# Patient Record
Sex: FEMALE | Race: WHITE | Employment: STUDENT | URBAN - METROPOLITAN AREA
[De-identification: names, ages, dates, MRNs, and addresses within clinical notes are randomized per-mention and may not be internally consistent; named-entity substitution may affect disease eponyms.]

---

## 2018-08-21 RX ORDER — ASCORBIC ACID 500 MG
500 TABLET ORAL DAILY PRN
COMMUNITY

## 2018-08-28 ENCOUNTER — ANESTHESIA EVENT (OUTPATIENT)
Dept: SURGERY | Facility: HOSPITAL | Age: 23
End: 2018-08-28
Payer: COMMERCIAL

## 2018-08-30 ENCOUNTER — ANESTHESIA (OUTPATIENT)
Dept: SURGERY | Facility: HOSPITAL | Age: 23
End: 2018-08-30
Payer: COMMERCIAL

## 2018-08-30 ENCOUNTER — SURGERY (OUTPATIENT)
Age: 23
End: 2018-08-30

## 2018-08-30 ENCOUNTER — HOSPITAL ENCOUNTER (OUTPATIENT)
Facility: HOSPITAL | Age: 23
Setting detail: HOSPITAL OUTPATIENT SURGERY
Discharge: HOME | End: 2018-08-30
Attending: OBSTETRICS & GYNECOLOGY | Admitting: OBSTETRICS & GYNECOLOGY
Payer: COMMERCIAL

## 2018-08-30 VITALS
DIASTOLIC BLOOD PRESSURE: 97 MMHG | WEIGHT: 132.25 LBS | HEART RATE: 100 BPM | TEMPERATURE: 99 F | SYSTOLIC BLOOD PRESSURE: 126 MMHG | RESPIRATION RATE: 18 BRPM | OXYGEN SATURATION: 100 % | HEIGHT: 66 IN | BODY MASS INDEX: 21.25 KG/M2

## 2018-08-30 DIAGNOSIS — N83.201 RIGHT OVARIAN CYST: ICD-10-CM

## 2018-08-30 LAB
POCT LOT NUMBER: NORMAL
POCT URINE PREGNANCY: NEGATIVE

## 2018-08-30 PROCEDURE — 81025 URINE PREGNANCY TEST: CPT | Performed by: OBSTETRICS & GYNECOLOGY

## 2018-08-30 PROCEDURE — 0UB24ZZ EXCISION OF BILATERAL OVARIES, PERCUTANEOUS ENDOSCOPIC APPROACH: ICD-10-PCS | Performed by: OBSTETRICS & GYNECOLOGY

## 2018-08-30 PROCEDURE — 88305 TISSUE EXAM BY PATHOLOGIST: CPT | Performed by: OBSTETRICS & GYNECOLOGY

## 2018-08-30 DEVICE — INTERCEED: Type: IMPLANTABLE DEVICE | Status: FUNCTIONAL

## 2018-08-30 RX ORDER — LORATADINE 10 MG/1
10 TABLET ORAL DAILY
COMMUNITY
End: 2021-05-10 | Stop reason: ALTCHOICE

## 2018-08-30 RX ORDER — NALOXONE HYDROCHLORIDE 0.4 MG/ML
80 INJECTION, SOLUTION INTRAMUSCULAR; INTRAVENOUS; SUBCUTANEOUS AS NEEDED
Status: DISCONTINUED | OUTPATIENT
Start: 2018-08-30 | End: 2018-08-31

## 2018-08-30 RX ORDER — ONDANSETRON 2 MG/ML
4 INJECTION INTRAMUSCULAR; INTRAVENOUS AS NEEDED
Status: DISCONTINUED | OUTPATIENT
Start: 2018-08-30 | End: 2018-08-31

## 2018-08-30 RX ORDER — SODIUM CHLORIDE, SODIUM LACTATE, POTASSIUM CHLORIDE, CALCIUM CHLORIDE 600; 310; 30; 20 MG/100ML; MG/100ML; MG/100ML; MG/100ML
INJECTION, SOLUTION INTRAVENOUS CONTINUOUS
Status: DISCONTINUED | OUTPATIENT
Start: 2018-08-30 | End: 2018-08-31

## 2018-08-30 RX ORDER — ACETAMINOPHEN 500 MG
1000 TABLET ORAL ONCE
COMMUNITY
End: 2021-05-10 | Stop reason: ALTCHOICE

## 2018-08-30 RX ORDER — SCOLOPAMINE TRANSDERMAL SYSTEM 1 MG/1
1 PATCH, EXTENDED RELEASE TRANSDERMAL ONCE
Status: DISCONTINUED | OUTPATIENT
Start: 2018-08-30 | End: 2018-08-31

## 2018-08-30 RX ORDER — HYDROCODONE BITARTRATE AND ACETAMINOPHEN 5; 325 MG/1; MG/1
1 TABLET ORAL AS NEEDED
Status: COMPLETED | OUTPATIENT
Start: 2018-08-30 | End: 2018-08-30

## 2018-08-30 RX ORDER — BUPIVACAINE HYDROCHLORIDE 5 MG/ML
INJECTION, SOLUTION EPIDURAL; INTRACAUDAL AS NEEDED
Status: DISCONTINUED | OUTPATIENT
Start: 2018-08-30 | End: 2018-08-30 | Stop reason: HOSPADM

## 2018-08-30 RX ORDER — DEXAMETHASONE SODIUM PHOSPHATE 4 MG/ML
4 VIAL (ML) INJECTION AS NEEDED
Status: DISCONTINUED | OUTPATIENT
Start: 2018-08-30 | End: 2018-08-31

## 2018-08-30 RX ORDER — MORPHINE SULFATE 4 MG/ML
2 INJECTION, SOLUTION INTRAMUSCULAR; INTRAVENOUS EVERY 5 MIN PRN
Status: DISCONTINUED | OUTPATIENT
Start: 2018-08-30 | End: 2018-08-31

## 2018-08-30 RX ORDER — MORPHINE SULFATE 4 MG/ML
INJECTION, SOLUTION INTRAMUSCULAR; INTRAVENOUS
Status: COMPLETED
Start: 2018-08-30 | End: 2018-08-30

## 2018-08-30 RX ORDER — HYDROCODONE BITARTRATE AND ACETAMINOPHEN 5; 325 MG/1; MG/1
TABLET ORAL
Status: DISCONTINUED
Start: 2018-08-30 | End: 2018-08-31

## 2018-08-30 RX ORDER — HYDROCODONE BITARTRATE AND ACETAMINOPHEN 5; 325 MG/1; MG/1
TABLET ORAL
Qty: 24 TABLET | Refills: 0 | Status: SHIPPED | COMMUNITY
Start: 2018-08-30 | End: 2018-12-17

## 2018-08-30 RX ORDER — ONDANSETRON 8 MG/1
8 TABLET, ORALLY DISINTEGRATING ORAL EVERY 8 HOURS PRN
Qty: 12 TABLET | Refills: 0 | Status: SHIPPED | OUTPATIENT
Start: 2018-08-30 | End: 2018-09-13

## 2018-08-30 RX ORDER — HYDROCODONE BITARTRATE AND ACETAMINOPHEN 5; 325 MG/1; MG/1
2 TABLET ORAL AS NEEDED
Status: COMPLETED | OUTPATIENT
Start: 2018-08-30 | End: 2018-08-30

## 2018-08-30 RX ORDER — ACETAMINOPHEN 500 MG
1000 TABLET ORAL ONCE
Status: DISCONTINUED | OUTPATIENT
Start: 2018-08-30 | End: 2018-08-30 | Stop reason: HOSPADM

## 2018-08-30 NOTE — ANESTHESIA PREPROCEDURE EVALUATION
PRE-OP EVALUATION    Patient Name: Rakesh Rivera    Pre-op Diagnosis: Right ovarian cyst [G99.217]    Procedure(s):  LAPAROSCOPIC RIGHT OVARIAN CYSTECTOMY POSSIBLE RIGHT OOPHORECTOMY    Surgeon(s) and Role:     * Jostin Tinoco MD - Primary     Luly Model, Pulmonary      (+) asthma                     Neuro/Psych                   (+) headaches                 Past Surgical History:  No date: ORAL SURGERY      Comment: had an adult tooth straightened   2011: OTHER      Comment: ACL and meniscus repair left

## 2018-08-30 NOTE — H&P
PREOPERATIVE HISTORY AND PHYSICAL:  Preoperative history and physical by Dr. Pita Raines on , for surgery on 18.     HPI: The patient is a 25year old female,  0, last menstrual period  with a history of right ovarian cyst. She presented for ev Rfl:         ALLERGIES: nkda    CIGARETTE USE: no    PHYSICAL EXAM:   Lungs: clear     Heart: RRR    Abdomen: Soft/NT/no palpable masses    Pelvic:    External genitalia: No lesions.    Uterus:av, Nontender, difficult to assess due to guarding  Adnexa:Non

## 2018-08-31 NOTE — H&P
Addendum to pre op note:    Under the HPI: the following phrase should not be present \"if pap normal and HPV neg, repeat in 5 years. New guidelines discussed. \"  This statement was inadvertently placed in the record. Please disregard this phrase.     The

## 2018-08-31 NOTE — ANESTHESIA POSTPROCEDURE EVALUATION
[id number] [facility] Patient Status:  Hospital Outpatient Surgery   Age/Gender 25year old female MRN [id number]   [facility] SURGERY Attending Gustavo Acuna MD   Hosp Day # 0 PCP [facility] MD, [facility]       Anesthesia Post-op N

## 2018-08-31 NOTE — OPERATIVE REPORT
Preoperative Diagnosis: right ovarian cyst    Postoperative Diagnosis: bilateral ovarian cysts    Procedure:Operative laparoscopy, bilateral ovarian cystectomies    Surgeon: Myrna Elliott    Assistant: Dagoberto Khan    Anesthesia: Gen    EBL: 50 cc    Specimen: addie confirmed. The pelvis and upper abdomen were examined with the findings as noted above. The inferior epigastric vessels were identified bilaterally.   After instillation of local anesthesia, a 5 mm incision was made in the right lower quadrant and a 5 mm escape from the peritoneal cavity. All trocars were removed and the incisions closed with subcuticular sutures. Steri strips were applied. The instruments were removed from the vagina. The tenaculum marks were hemostatic. The christensen was removed.  Urine ou

## 2018-09-06 NOTE — PROGRESS NOTES
VALENTINE on patient's cell with final path. Discussed with her mom on phone:    Would recommend she make apt to discuss ocps.

## 2021-09-27 PROBLEM — G43.011 MIGRAINE WITHOUT AURA, WITH INTRACTABLE MIGRAINE, SO STATED, WITH STATUS MIGRAINOSUS: Status: ACTIVE | Noted: 2017-10-09

## 2021-09-27 PROBLEM — N80.9 ENDOMETRIOSIS: Status: ACTIVE | Noted: 2021-09-27

## 2021-09-27 PROBLEM — J45.20 MILD INTERMITTENT ASTHMA WITHOUT COMPLICATION: Status: ACTIVE | Noted: 2017-08-15

## 2021-09-27 PROBLEM — G43.909 MIGRAINES: Status: ACTIVE | Noted: 2021-09-27

## 2021-09-27 PROBLEM — N83.201 RIGHT OVARIAN CYST: Status: ACTIVE | Noted: 2018-07-06

## 2025-07-03 NOTE — INTERVAL H&P NOTE
07/03/25 11:55 AM Received incoming call from patient. Verified patient ID x [id number]. Patient states that pharmacy was only able to dispense a partial fill of her Morphine prescription sent on 07/01--patient states she received 23 tablets. Since this medication is an opioid, explained that remainder of prescription is voided and patient will need new prescription when she runs low on current supply. Dinora PERKINS advised that she would proactively send in prescription so pharmacy can order medication. Patient voiced understanding. No further questions or concerns at this time.    Pre-op Diagnosis: Right ovarian cyst [N83.201]    The above referenced H&P was reviewed by Osvaldo Colmenaers MD on 8/30/2018, the patient was examined and no significant changes have occurred in the patient's condition since the H&P was performed.   I disc

## (undated) DEVICE — KENDALL SCD EXPRESS SLEEVES, KNEE LENGTH, MEDIUM: Brand: KENDALL SCD

## (undated) DEVICE — GLOVE SURG TRIUMPH SZ 6-1/2

## (undated) DEVICE — PKS LYONS DISSECTING FORCEPS 5MM/33CM: Brand: PK TECHNOLOGY

## (undated) DEVICE — PAD SACRAL SPAN AID

## (undated) DEVICE — GYN LAP CDS: Brand: MEDLINE INDUSTRIES, INC.

## (undated) DEVICE — SUTURE MONOCRYL 4-0 PS-2

## (undated) DEVICE — TROCAR: Brand: KII® SLEEVE

## (undated) DEVICE — DERMABOND LIQUID ADHESIVE

## (undated) DEVICE — TROCAR: Brand: KII FIOS FIRST ENTRY

## (undated) DEVICE — STRYKER HARMONIC 36CM REPRCSED

## (undated) DEVICE — GLOVE SURG SENSICARE SZ 6

## (undated) DEVICE — SOL  .9 1000ML BTL

## (undated) DEVICE — 40580 - THE PINK PAD - ADVANCED TRENDELENBURG POSITIONING KIT: Brand: 40580 - THE PINK PAD - ADVANCED TRENDELENBURG POSITIONING KIT

## (undated) DEVICE — SOL  .9 1000ML BAG